# Patient Record
Sex: FEMALE | Race: WHITE | URBAN - METROPOLITAN AREA
[De-identification: names, ages, dates, MRNs, and addresses within clinical notes are randomized per-mention and may not be internally consistent; named-entity substitution may affect disease eponyms.]

---

## 2018-11-13 ENCOUNTER — NEW PATIENT (OUTPATIENT)
Dept: URBAN - METROPOLITAN AREA CLINIC 66 | Facility: CLINIC | Age: 43
End: 2018-11-13

## 2018-11-13 ASSESSMENT — KERATOMETRY
OS: 43.80
OD: 43.86

## 2018-11-13 ASSESSMENT — VISUAL ACUITY
OS: 20/20
OD: 20/20

## 2018-11-13 ASSESSMENT — INTRAOCULAR PRESSURE
OD: 14
OS: 14

## 2021-04-14 ENCOUNTER — OFFICE VISIT (OUTPATIENT)
Dept: URBAN - METROPOLITAN AREA CLINIC 64 | Facility: CLINIC | Age: 46
End: 2021-04-14

## 2021-04-14 ASSESSMENT — INTRAOCULAR PRESSURE
OS: 10
OD: 11

## 2021-04-14 ASSESSMENT — VISUAL ACUITY
OD: 20/20
OS: 20/20

## 2021-04-14 NOTE — IMPRESSION/PLAN
Impression: Myopia, bilateral: H52.13. Plan: Candidate for lasik OU. Recommend mono-vision correction. She mainly wears CLs now with no glasses used for near. Final refraction / tx recommended is OD -4.25 sph, OS -4.00 sph. Note:  She did well today with trial [facility] dailies OD -4.00, OS -3.75 (adjusted for vertex distance). Note:  4/15 pt called and wanted closer near vision. Gave trials [facility]IA DEL CARMEN dailies OD -3.75 OU. If she likes this final lasik treatment would be -4.00 sph OU.

## 2021-05-19 ENCOUNTER — OFFICE VISIT (OUTPATIENT)
Dept: URBAN - METROPOLITAN AREA CLINIC 64 | Facility: CLINIC | Age: 46
End: 2021-05-19

## 2021-05-19 DIAGNOSIS — H52.13 MYOPIA, BILATERAL: Primary | ICD-10-CM

## 2021-05-19 PROCEDURE — 92015 DETERMINE REFRACTIVE STATE: CPT | Performed by: OPTOMETRIST

## 2021-05-19 ASSESSMENT — KERATOMETRY
OS: 43.55
OD: 43.69

## 2021-05-19 ASSESSMENT — VISUAL ACUITY
OS: 20/20
OD: 20/20

## 2021-06-03 ENCOUNTER — OFFICE VISIT (OUTPATIENT)
Dept: URBAN - METROPOLITAN AREA CLINIC 64 | Facility: CLINIC | Age: 46
End: 2021-06-03

## 2021-06-03 PROCEDURE — V2799 MISC VISION ITEM OR SERVICE: HCPCS | Performed by: OPTOMETRIST

## 2021-06-03 ASSESSMENT — VISUAL ACUITY
OD: 20/20
OS: 20/20

## 2021-06-10 ENCOUNTER — FOLLOW UP ESTABLISHED (OUTPATIENT)
Dept: URBAN - METROPOLITAN AREA CLINIC 64 | Facility: CLINIC | Age: 46
End: 2021-06-10

## 2021-06-10 PROCEDURE — 92015 DETERMINE REFRACTIVE STATE: CPT | Performed by: OPTOMETRIST

## 2021-06-10 ASSESSMENT — VISUAL ACUITY
OS: 20/20
OD: 20/20

## 2021-06-10 ASSESSMENT — KERATOMETRY
OD: 43.72
OS: 43.66

## 2021-08-05 ENCOUNTER — Encounter (OUTPATIENT)
Dept: URBAN - METROPOLITAN AREA CLINIC 66 | Facility: CLINIC | Age: 46
End: 2021-08-05

## 2021-08-05 PROCEDURE — LASIK LASIK SURGEON'S FEE: CUSTOM | Performed by: OPHTHALMOLOGY

## 2021-08-05 PROCEDURE — LASIK LASIK PRKSURGEON'S FEE: CUSTOM | Performed by: OPHTHALMOLOGY

## 2021-08-06 ENCOUNTER — POST-OPERATIVE VISIT (OUTPATIENT)
Dept: URBAN - METROPOLITAN AREA CLINIC 64 | Facility: CLINIC | Age: 46
End: 2021-08-06

## 2021-08-06 PROCEDURE — 99024 POSTOP FOLLOW-UP VISIT: CPT | Performed by: OPTOMETRIST

## 2021-08-06 NOTE — IMPRESSION/PLAN
Impression: S/P LASIK - Standard OU - . Encounter for surgical aftercare following surgery on a sense organ  Z48.810. Plan: S/P Monovision lasik - doing well. RTC 2 wks.

## 2021-08-20 ENCOUNTER — POST-OPERATIVE VISIT (OUTPATIENT)
Dept: URBAN - METROPOLITAN AREA CLINIC 64 | Facility: CLINIC | Age: 46
End: 2021-08-20

## 2021-08-20 PROCEDURE — 99024 POSTOP FOLLOW-UP VISIT: CPT | Performed by: OPTOMETRIST

## 2021-08-20 ASSESSMENT — VISUAL ACUITY
OD: 20/20
OS: 20/20

## 2021-08-20 NOTE — IMPRESSION/PLAN
Impression: S/P LASIK - Standard OU - 15 Days. Encounter for surgical aftercare following surgery on a sense organ  Z48.810. Plan: S/P Lasik OU - mono-vision - near eye is too close. 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 May need enhancement OD to push out near vision. RTC 1 month.

## 2021-09-30 ENCOUNTER — POST-OPERATIVE VISIT (OUTPATIENT)
Dept: URBAN - METROPOLITAN AREA CLINIC 64 | Facility: CLINIC | Age: 46
End: 2021-09-30

## 2021-09-30 PROCEDURE — 99024 POSTOP FOLLOW-UP VISIT: CPT | Performed by: OPTOMETRIST

## 2021-09-30 ASSESSMENT — VISUAL ACUITY
OS: 20/20
OD: 20/20

## 2021-09-30 NOTE — IMPRESSION/PLAN
Impression: S/P Lasik OD near OS distace. OU - 56 Days. Encounter for surgical aftercare following surgery on a sense organ  Z48.810. Plan: S/P Lasik OU - mono-vision - near eye feels too close. 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 May need enhancement OD to push out near vision. Discussed enhancement. She is worried she will loose her near vision with enhancement. She would like to wait for now. Re-check in 2-3 months. We can trial with loose lenses prior to enhancement OD.

## 2021-12-20 ENCOUNTER — POST-OPERATIVE VISIT (OUTPATIENT)
Dept: URBAN - METROPOLITAN AREA CLINIC 64 | Facility: CLINIC | Age: 46
End: 2021-12-20

## 2021-12-20 DIAGNOSIS — Z48.810 ENCOUNTER FOR SURGICAL AFTERCARE FOLLOWING SURGERY ON A SENSE ORGAN: Primary | ICD-10-CM

## 2021-12-20 PROCEDURE — 99024 POSTOP FOLLOW-UP VISIT: CPT | Performed by: OPTOMETRIST

## 2021-12-20 ASSESSMENT — VISUAL ACUITY
OS: 20/20
OD: 20/20

## 2021-12-20 NOTE — IMPRESSION/PLAN
Impression: S/P LASIK OU - 137 Days. Encounter for surgical aftercare following surgery on a sense organ  Z48.810. Plan: S/P Lasik OU - mono-vision - near eye feels too close. 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 Discussed enhancement OD to push out near vision. Trialed with loose lens (-0.75). She is worried she will loose her near vision with enhancement. No further tx recommended / desired at this time. Recommend yearly eye exams.

## 2022-09-12 ENCOUNTER — OFFICE VISIT (OUTPATIENT)
Dept: URBAN - METROPOLITAN AREA CLINIC 64 | Facility: CLINIC | Age: 47
End: 2022-09-12
Payer: COMMERCIAL

## 2022-09-12 DIAGNOSIS — H52.13 MYOPIA, BILATERAL: Primary | ICD-10-CM

## 2022-09-12 PROCEDURE — 99214 OFFICE O/P EST MOD 30 MIN: CPT | Performed by: OPTOMETRIST

## 2022-09-12 ASSESSMENT — VISUAL ACUITY
OS: 20/20
OD: 20/20

## 2022-09-12 ASSESSMENT — KERATOMETRY
OS: 40.47
OD: 41.25

## 2022-09-12 ASSESSMENT — INTRAOCULAR PRESSURE
OD: 13
OS: 14

## 2022-09-12 NOTE — IMPRESSION/PLAN
Impression: Myopia, bilateral: H52.13. Plan: Pt still not happy with vision, see Dr. Kylah Lewis to consider lap lift and touch up.

## 2022-10-25 ENCOUNTER — OFFICE VISIT (OUTPATIENT)
Dept: URBAN - METROPOLITAN AREA CLINIC 64 | Facility: CLINIC | Age: 47
End: 2022-10-25
Payer: COMMERCIAL

## 2022-10-25 DIAGNOSIS — H52.13 MYOPIA, BILATERAL: Primary | ICD-10-CM

## 2022-10-25 PROCEDURE — 92015 DETERMINE REFRACTIVE STATE: CPT | Performed by: OPTOMETRIST

## 2022-10-25 PROCEDURE — 99213 OFFICE O/P EST LOW 20 MIN: CPT | Performed by: OPTOMETRIST

## 2022-10-25 ASSESSMENT — VISUAL ACUITY
OD: 20/20
OS: 20/20

## 2022-10-25 NOTE — IMPRESSION/PLAN
Impression: Myopia, bilateral: H52.13. Plan: Recommend [address] over LASIK for enhancement OD. R/B/A's discussed of [address] over LASIK. Patient would like to be less myopic in OD to have better intermediate vision OD. Will do a full review of records and discuss case with colleagues. 

Follow up with Dr. gD Savage 1-2 weeks

## 2022-11-04 ENCOUNTER — OFFICE VISIT (OUTPATIENT)
Dept: URBAN - METROPOLITAN AREA CLINIC 64 | Facility: CLINIC | Age: 47
End: 2022-11-04
Payer: COMMERCIAL

## 2022-11-04 DIAGNOSIS — H52.13 MYOPIA, BILATERAL: Primary | ICD-10-CM

## 2022-11-04 PROCEDURE — 99213 OFFICE O/P EST LOW 20 MIN: CPT | Performed by: STUDENT IN AN ORGANIZED HEALTH CARE EDUCATION/TRAINING PROGRAM

## 2022-11-04 NOTE — IMPRESSION/PLAN
Impression: Myopia, bilateral: H52.13. Plan: Residual Myopia OD after LASIK one year ago, patient states she was unhappy with her near vision OD right after the surgery
-Followed up with DR Raymundo Victor but was told it could take upwards of a year to heal and that she would be happier with her current MRx
-Currently at a -3.00 and would like to read material at intermediate arms length
-Refer patient to [address] [place] [address] for one final RC and choosing the best target for AIM
-Then schedule for LASIK day in November Discussed risks with enhancement including epithelial ingrowth and infection, surprise with refractive target, further enhancement, and possible corneal ectasia. Patient understood all of these risks and still wishes to proceed.

## 2022-11-10 ENCOUNTER — REFRACTIVE (OUTPATIENT)
Dept: URBAN - METROPOLITAN AREA CLINIC 64 | Facility: CLINIC | Age: 47
End: 2022-11-10
Payer: COMMERCIAL

## 2022-11-10 DIAGNOSIS — H52.13 MYOPIA, BILATERAL: Primary | ICD-10-CM

## 2022-11-10 ASSESSMENT — KERATOMETRY: OD: 41.21

## 2022-12-22 ENCOUNTER — OFFICE VISIT (OUTPATIENT)
Dept: URBAN - METROPOLITAN AREA CLINIC 64 | Facility: CLINIC | Age: 47
End: 2022-12-22
Payer: COMMERCIAL

## 2022-12-22 DIAGNOSIS — H52.13 MYOPIA, BILATERAL: Primary | ICD-10-CM

## 2022-12-22 PROCEDURE — 92015 DETERMINE REFRACTIVE STATE: CPT | Performed by: OPTOMETRIST

## 2022-12-22 ASSESSMENT — KERATOMETRY
OD: 41.27
OS: 40.37

## 2022-12-22 ASSESSMENT — VISUAL ACUITY: OD: 20/25

## 2022-12-22 NOTE — IMPRESSION/PLAN
Impression: Myopia, bilateral: H52.13.  Plan: Residual Myopia OD after LASIK one year ago, patient states she was unhappy with her near vision OD right after the surgery
-Followed up with DR Barry Martinez but was told it could take upwards of a year to heal and that she would be happier with her current MRx
-Currently at a -3.00 and would like to read material at intermediate arms length
-Pt currently scheduled for LASIK enhancement OD in January 2023
-Proceed with -1.25 aim OD

## 2023-05-24 NOTE — IMPRESSION/PLAN
Impression: Myopia, bilateral: H52.13. Plan: Currently trying monovision CLs prior to lasik. Doing well at distance with -4.25 OS.  -3.00 OD was a little too close.  -3.50 OD was a little too far. She will try -3.25 OD this week. [employer] OU. RTC 1 wk Rx check to find final Rx for lasik. Do L6 template and repeat manifest.  Near aim OD, distance aim OS. [employer] referral